# Patient Record
Sex: FEMALE | Race: WHITE
[De-identification: names, ages, dates, MRNs, and addresses within clinical notes are randomized per-mention and may not be internally consistent; named-entity substitution may affect disease eponyms.]

---

## 2017-03-21 ENCOUNTER — HOSPITAL ENCOUNTER (OUTPATIENT)
Dept: HOSPITAL 19 - COL.RAD | Age: 22
End: 2017-03-21
Attending: FAMILY MEDICINE
Payer: COMMERCIAL

## 2017-03-21 DIAGNOSIS — R10.2: Primary | ICD-10-CM

## 2019-09-04 ENCOUNTER — HOSPITAL ENCOUNTER (EMERGENCY)
Dept: HOSPITAL 19 - COL.ER | Age: 24
Discharge: HOME | End: 2019-09-04
Payer: COMMERCIAL

## 2019-09-04 ENCOUNTER — HOSPITAL ENCOUNTER (OUTPATIENT)
Dept: HOSPITAL 19 - COL.ER | Age: 24
End: 2019-09-04
Attending: FAMILY MEDICINE

## 2019-09-04 ENCOUNTER — HOSPITAL ENCOUNTER (OUTPATIENT)
Dept: HOSPITAL 19 - LDRO | Age: 24
Discharge: HOME | End: 2019-09-04
Attending: FAMILY MEDICINE
Payer: SELF-PAY

## 2019-09-04 VITALS — HEIGHT: 64.02 IN | WEIGHT: 165.35 LBS | BODY MASS INDEX: 28.23 KG/M2

## 2019-09-04 VITALS — TEMPERATURE: 98.5 F | HEART RATE: 110 BPM | DIASTOLIC BLOOD PRESSURE: 80 MMHG | SYSTOLIC BLOOD PRESSURE: 142 MMHG

## 2019-09-04 DIAGNOSIS — T74.21XA: Primary | ICD-10-CM

## 2019-09-04 DIAGNOSIS — Z04.41: Primary | ICD-10-CM

## 2019-09-04 NOTE — NUR
Flagyl 2 grams PO single dose order sent to [facility] in [place], [place]. Pt instructed on alcohol consumption with abx use. Denied questions or
concerns.